# Patient Record
Sex: MALE
[De-identification: names, ages, dates, MRNs, and addresses within clinical notes are randomized per-mention and may not be internally consistent; named-entity substitution may affect disease eponyms.]

---

## 2017-07-27 ENCOUNTER — RX ONLY (OUTPATIENT)
Age: 42
Setting detail: RX ONLY
End: 2017-07-27

## 2019-01-31 ENCOUNTER — APPOINTMENT (RX ONLY)
Dept: URBAN - METROPOLITAN AREA CLINIC 114 | Facility: CLINIC | Age: 44
Setting detail: DERMATOLOGY
End: 2019-01-31

## 2019-01-31 VITALS — HEIGHT: 71 IN | WEIGHT: 240 LBS

## 2019-01-31 DIAGNOSIS — B35.3 TINEA PEDIS: ICD-10-CM

## 2019-01-31 DIAGNOSIS — L28.0 LICHEN SIMPLEX CHRONICUS: ICD-10-CM | Status: INADEQUATELY CONTROLLED

## 2019-01-31 PROBLEM — D18.01 HEMANGIOMA OF SKIN AND SUBCUTANEOUS TISSUE: Status: ACTIVE | Noted: 2019-01-31

## 2019-01-31 PROBLEM — L443 OTHER LICHEN, NOT ELSEWHERE CLASSIFIED: Status: ACTIVE | Noted: 2019-01-31

## 2019-01-31 PROBLEM — L442 OTHER LICHEN, NOT ELSEWHERE CLASSIFIED: Status: ACTIVE | Noted: 2019-01-31

## 2019-01-31 PROBLEM — L281 LICHENIFICATION AND LICHEN SIMPLEX CHRONICUS: Status: ACTIVE | Noted: 2019-01-31

## 2019-01-31 PROBLEM — L280 LICHENIFICATION AND LICHEN SIMPLEX CHRONICUS: Status: ACTIVE | Noted: 2019-01-31

## 2019-01-31 PROBLEM — L28.1 PRURIGO NODULARIS: Status: ACTIVE | Noted: 2019-01-31

## 2019-01-31 PROBLEM — L82.1 OTHER SEBORRHEIC KERATOSIS: Status: ACTIVE | Noted: 2019-01-31

## 2019-01-31 PROBLEM — D48.5 NEOPLASM OF UNCERTAIN BEHAVIOR OF SKIN: Status: ACTIVE | Noted: 2019-01-31

## 2019-01-31 PROBLEM — L72.3 SEBACEOUS CYST: Status: ACTIVE | Noted: 2019-01-31

## 2019-01-31 PROCEDURE — ? KOH PREP

## 2019-01-31 PROCEDURE — 99213 OFFICE O/P EST LOW 20 MIN: CPT

## 2019-01-31 PROCEDURE — 87220 TISSUE EXAM FOR FUNGI: CPT

## 2019-01-31 PROCEDURE — ? PRESCRIPTION

## 2019-01-31 PROCEDURE — ? TREATMENT REGIMEN

## 2019-01-31 PROCEDURE — ? COUNSELING

## 2019-01-31 RX ORDER — TERBINAFINE HYDROCHLORIDE 250 MG/1
TABLET ORAL QD
Qty: 30 | Refills: 1 | Status: ERX | COMMUNITY
Start: 2019-01-31

## 2019-01-31 RX ORDER — CLOBETASOL PROPIONATE 0.25 MG/G
CREAM TOPICAL
Qty: 1 | Refills: 5 | Status: ERX | COMMUNITY
Start: 2019-01-31

## 2019-01-31 RX ADMIN — CLOBETASOL PROPIONATE: 0.25 CREAM TOPICAL at 16:29

## 2019-01-31 RX ADMIN — TERBINAFINE HYDROCHLORIDE: 250 TABLET ORAL at 16:43

## 2019-01-31 ASSESSMENT — LOCATION DETAILED DESCRIPTION DERM: LOCATION DETAILED: LEFT DORSAL 3RD TOE

## 2019-01-31 ASSESSMENT — SEVERITY ASSESSMENT
SEVERITY: MODERATE
SEVERITY: MILD TO MODERATE

## 2019-01-31 ASSESSMENT — LOCATION ZONE DERM: LOCATION ZONE: TOE

## 2019-01-31 ASSESSMENT — LOCATION SIMPLE DESCRIPTION DERM: LOCATION SIMPLE: LEFT 3RD TOE

## 2019-01-31 NOTE — PROCEDURE: TREATMENT REGIMEN
Detail Level: Zone
Initiate Treatment: Terbinafine 500mg once daily x1 month
Initiate Treatment: Impoyz 0.025% cream twice daily until resolved then use as needed